# Patient Record
Sex: MALE | Race: OTHER | Employment: FULL TIME | URBAN - METROPOLITAN AREA
[De-identification: names, ages, dates, MRNs, and addresses within clinical notes are randomized per-mention and may not be internally consistent; named-entity substitution may affect disease eponyms.]

---

## 2018-10-11 ENCOUNTER — HOSPITAL ENCOUNTER (OUTPATIENT)
Dept: LAB | Facility: MEDICAL CENTER | Age: 58
End: 2018-10-11
Attending: FAMILY MEDICINE
Payer: COMMERCIAL

## 2018-10-11 LAB
ALBUMIN SERPL BCP-MCNC: 4.6 G/DL (ref 3.2–4.9)
ALBUMIN/GLOB SERPL: 2 G/DL
ALP SERPL-CCNC: 60 U/L (ref 30–99)
ALT SERPL-CCNC: 19 U/L (ref 2–50)
ANION GAP SERPL CALC-SCNC: 6 MMOL/L (ref 0–11.9)
AST SERPL-CCNC: 17 U/L (ref 12–45)
BILIRUB SERPL-MCNC: 0.8 MG/DL (ref 0.1–1.5)
BUN SERPL-MCNC: 16 MG/DL (ref 8–22)
CALCIUM SERPL-MCNC: 9.6 MG/DL (ref 8.5–10.5)
CHLORIDE SERPL-SCNC: 107 MMOL/L (ref 96–112)
CHOLEST SERPL-MCNC: 215 MG/DL (ref 100–199)
CO2 SERPL-SCNC: 27 MMOL/L (ref 20–33)
CREAT SERPL-MCNC: 1.03 MG/DL (ref 0.5–1.4)
EST. AVERAGE GLUCOSE BLD GHB EST-MCNC: 128 MG/DL
GLOBULIN SER CALC-MCNC: 2.3 G/DL (ref 1.9–3.5)
GLUCOSE SERPL-MCNC: 103 MG/DL (ref 65–99)
HBA1C MFR BLD: 6.1 % (ref 0–5.6)
HDLC SERPL-MCNC: 32 MG/DL
LDLC SERPL CALC-MCNC: 145 MG/DL
POTASSIUM SERPL-SCNC: 3.6 MMOL/L (ref 3.6–5.5)
PROT SERPL-MCNC: 6.9 G/DL (ref 6–8.2)
SODIUM SERPL-SCNC: 140 MMOL/L (ref 135–145)
TRIGL SERPL-MCNC: 189 MG/DL (ref 0–149)

## 2018-10-11 PROCEDURE — 83036 HEMOGLOBIN GLYCOSYLATED A1C: CPT

## 2018-10-11 PROCEDURE — 80053 COMPREHEN METABOLIC PANEL: CPT

## 2018-10-11 PROCEDURE — 36415 COLL VENOUS BLD VENIPUNCTURE: CPT

## 2018-10-11 PROCEDURE — 80061 LIPID PANEL: CPT

## 2018-11-24 ENCOUNTER — HOSPITAL ENCOUNTER (OUTPATIENT)
Dept: LAB | Facility: MEDICAL CENTER | Age: 58
End: 2018-11-24
Attending: FAMILY MEDICINE
Payer: COMMERCIAL

## 2018-11-24 LAB
ALBUMIN SERPL BCP-MCNC: 4.4 G/DL (ref 3.2–4.9)
ALBUMIN/GLOB SERPL: 1.8 G/DL
ALP SERPL-CCNC: 63 U/L (ref 30–99)
ALT SERPL-CCNC: 25 U/L (ref 2–50)
ANION GAP SERPL CALC-SCNC: 6 MMOL/L (ref 0–11.9)
AST SERPL-CCNC: 20 U/L (ref 12–45)
BILIRUB SERPL-MCNC: 0.8 MG/DL (ref 0.1–1.5)
BUN SERPL-MCNC: 12 MG/DL (ref 8–22)
CALCIUM SERPL-MCNC: 9.5 MG/DL (ref 8.5–10.5)
CHLORIDE SERPL-SCNC: 107 MMOL/L (ref 96–112)
CHOLEST SERPL-MCNC: 120 MG/DL (ref 100–199)
CO2 SERPL-SCNC: 29 MMOL/L (ref 20–33)
CREAT SERPL-MCNC: 1.02 MG/DL (ref 0.5–1.4)
GLOBULIN SER CALC-MCNC: 2.4 G/DL (ref 1.9–3.5)
GLUCOSE SERPL-MCNC: 95 MG/DL (ref 65–99)
HDLC SERPL-MCNC: 35 MG/DL
LDLC SERPL CALC-MCNC: 52 MG/DL
POTASSIUM SERPL-SCNC: 4.1 MMOL/L (ref 3.6–5.5)
PROT SERPL-MCNC: 6.8 G/DL (ref 6–8.2)
SODIUM SERPL-SCNC: 142 MMOL/L (ref 135–145)
TRIGL SERPL-MCNC: 167 MG/DL (ref 0–149)

## 2018-11-24 PROCEDURE — 80061 LIPID PANEL: CPT

## 2018-11-24 PROCEDURE — 80053 COMPREHEN METABOLIC PANEL: CPT

## 2018-11-24 PROCEDURE — 36415 COLL VENOUS BLD VENIPUNCTURE: CPT

## 2019-05-21 ENCOUNTER — HOSPITAL ENCOUNTER (OUTPATIENT)
Dept: LAB | Facility: MEDICAL CENTER | Age: 59
End: 2019-05-21
Attending: FAMILY MEDICINE
Payer: COMMERCIAL

## 2019-05-21 LAB
ALBUMIN SERPL BCP-MCNC: 4.2 G/DL (ref 3.2–4.9)
ALBUMIN/GLOB SERPL: 1.9 G/DL
ALP SERPL-CCNC: 72 U/L (ref 30–99)
ALT SERPL-CCNC: 23 U/L (ref 2–50)
ANION GAP SERPL CALC-SCNC: 8 MMOL/L (ref 0–11.9)
AST SERPL-CCNC: 18 U/L (ref 12–45)
BILIRUB SERPL-MCNC: 0.8 MG/DL (ref 0.1–1.5)
BUN SERPL-MCNC: 12 MG/DL (ref 8–22)
CALCIUM SERPL-MCNC: 8.9 MG/DL (ref 8.5–10.5)
CHLORIDE SERPL-SCNC: 109 MMOL/L (ref 96–112)
CHOLEST SERPL-MCNC: 192 MG/DL (ref 100–199)
CO2 SERPL-SCNC: 25 MMOL/L (ref 20–33)
CREAT SERPL-MCNC: 1.04 MG/DL (ref 0.5–1.4)
EST. AVERAGE GLUCOSE BLD GHB EST-MCNC: 126 MG/DL
FASTING STATUS PATIENT QL REPORTED: NORMAL
GLOBULIN SER CALC-MCNC: 2.2 G/DL (ref 1.9–3.5)
GLUCOSE SERPL-MCNC: 101 MG/DL (ref 65–99)
HBA1C MFR BLD: 6 % (ref 0–5.6)
HDLC SERPL-MCNC: 32 MG/DL
LDLC SERPL CALC-MCNC: 133 MG/DL
POTASSIUM SERPL-SCNC: 3.8 MMOL/L (ref 3.6–5.5)
PROT SERPL-MCNC: 6.4 G/DL (ref 6–8.2)
SODIUM SERPL-SCNC: 142 MMOL/L (ref 135–145)
TRIGL SERPL-MCNC: 137 MG/DL (ref 0–149)

## 2019-05-21 PROCEDURE — 83036 HEMOGLOBIN GLYCOSYLATED A1C: CPT

## 2019-05-21 PROCEDURE — 80053 COMPREHEN METABOLIC PANEL: CPT

## 2019-05-21 PROCEDURE — 36415 COLL VENOUS BLD VENIPUNCTURE: CPT

## 2019-05-21 PROCEDURE — 80061 LIPID PANEL: CPT

## 2021-05-21 ENCOUNTER — OCCUPATIONAL MEDICINE (OUTPATIENT)
Dept: URGENT CARE | Facility: CLINIC | Age: 61
End: 2021-05-21
Payer: COMMERCIAL

## 2021-05-21 VITALS
WEIGHT: 175 LBS | OXYGEN SATURATION: 98 % | TEMPERATURE: 97.1 F | HEART RATE: 61 BPM | SYSTOLIC BLOOD PRESSURE: 118 MMHG | RESPIRATION RATE: 16 BRPM | DIASTOLIC BLOOD PRESSURE: 70 MMHG | BODY MASS INDEX: 27.47 KG/M2 | HEIGHT: 67 IN

## 2021-05-21 DIAGNOSIS — M25.511 ACUTE PAIN OF BOTH SHOULDERS: ICD-10-CM

## 2021-05-21 DIAGNOSIS — S46.919A MUSCLE STRAIN OF SHOULDER REGION, UNSPECIFIED LATERALITY, INITIAL ENCOUNTER: ICD-10-CM

## 2021-05-21 DIAGNOSIS — Z02.83 ENCOUNTER FOR DRUG SCREENING: ICD-10-CM

## 2021-05-21 DIAGNOSIS — M25.512 ACUTE PAIN OF BOTH SHOULDERS: ICD-10-CM

## 2021-05-21 LAB
AMP AMPHETAMINE: NORMAL
BAR BARBITURATES: NORMAL
BREATH ALCOHOL COMMENT: NORMAL
BZO BENZODIAZEPINES: NORMAL
COC COCAINE: NORMAL
INT CON NEG: NORMAL
INT CON POS: NORMAL
MDMA ECSTASY: NORMAL
MET METHAMPHETAMINES: NORMAL
MTD METHADONE: NORMAL
OPI OPIATES: NORMAL
OXY OXYCODONE: NORMAL
PCP PHENCYCLIDINE: NORMAL
POC BREATHALIZER: 0 PERCENT (ref 0–0.01)
POC URINE DRUG SCREEN OCDRS: NEGATIVE
THC: NORMAL

## 2021-05-21 PROCEDURE — 80305 DRUG TEST PRSMV DIR OPT OBS: CPT | Performed by: NURSE PRACTITIONER

## 2021-05-21 PROCEDURE — 82075 ASSAY OF BREATH ETHANOL: CPT | Performed by: NURSE PRACTITIONER

## 2021-05-21 PROCEDURE — 99203 OFFICE O/P NEW LOW 30 MIN: CPT | Performed by: NURSE PRACTITIONER

## 2021-05-21 RX ORDER — LISINOPRIL 20 MG/1
TABLET ORAL
COMMUNITY
Start: 2021-03-31

## 2021-05-21 ASSESSMENT — ENCOUNTER SYMPTOMS
WEAKNESS: 0
SENSORY CHANGE: 0

## 2021-05-21 NOTE — PATIENT INSTRUCTIONS
Shoulder Pain  Many things can cause shoulder pain, including:  · An injury to the shoulder.  · Overuse of the shoulder.  · Arthritis.  The source of the pain can be:  · Inflammation.  · An injury to the shoulder joint.  · An injury to a tendon, ligament, or bone.  Follow these instructions at home:  Pay attention to changes in your symptoms. Let your health care provider know about them. Follow these instructions to relieve your pain.  If you have a sling:  · Wear the sling as told by your health care provider. Remove it only as told by your health care provider.  · Loosen the sling if your fingers tingle, become numb, or turn cold and blue.  · Keep the sling clean.  · If the sling is not waterproof:  ? Do not let it get wet. Remove it to shower or bathe.  · Move your arm as little as possible, but keep your hand moving to prevent swelling.  Managing pain, stiffness, and swelling    · If directed, put ice on the painful area:  ? Put ice in a plastic bag.  ? Place a towel between your skin and the bag.  ? Leave the ice on for 20 minutes, 2-3 times per day. Stop applying ice if it does not help with the pain.  · Squeeze a soft ball or a foam pad as much as possible. This helps to keep the shoulder from swelling. It also helps to strengthen the arm.  General instructions  · Take over-the-counter and prescription medicines only as told by your health care provider.  · Keep all follow-up visits as told by your health care provider. This is important.  Contact a health care provider if:  · Your pain gets worse.  · Your pain is not relieved with medicines.  · New pain develops in your arm, hand, or fingers.  Get help right away if:  · Your arm, hand, or fingers:  ? Tingle.  ? Become numb.  ? Become swollen.  ? Become painful.  ? Turn white or blue.  Summary  · Shoulder pain can be caused by an injury, overuse, or arthritis.  · Pay attention to changes in your symptoms. Let your health care provider know about  them.  · This condition may be treated with a sling, ice, and pain medicines.  · Contact your health care provider if the pain gets worse or new pain develops. Get help right away if your arm, hand, or fingers tingle or become numb, swollen, or painful.  · Keep all follow-up visits as told by your health care provider. This is important.  This information is not intended to replace advice given to you by your health care provider. Make sure you discuss any questions you have with your health care provider.  Document Released: 09/27/2006 Document Revised: 07/02/2019 Document Reviewed: 07/02/2019  Elsevier Patient Education © 2020 Elsevier Inc.

## 2021-05-21 NOTE — LETTER
[facility] [address] [place] [address]  Phone:  [phone] - Fax:  [phone]   [facility] Progress Report and Disability Certification  Date of Service: 5/21/2021   No Show:  No  Date / Time of Next Visit: 5/24/2021 @ 4:30 PM [facility]   Claim Information   Patient Name: Jennifer Ji  Claim Number:     Employer: [employer]  Date of Injury: 5/20/2021     Insurer / TPA: [employer]  ID / SSN:     Occupation: Laundry Eng'r  Diagnosis: Diagnoses of Muscle strain of shoulder region, unspecified laterality, initial encounter, Acute pain of both shoulders, and Encounter for drug screening were pertinent to this visit.    Medical Information   Related to Industrial Injury? Yes    Subjective Complaints:  DOI: 05/20/2021. Patient reports waking up with bilateral shoulder pain this morning. Relates it to activities at work the previous day. He had been laying on his stomach replacing a hose, with his left arm bent on piping, and his right arm extended in front of him when his left arm slipped. Did not feel pain yesterday. Has had improvement today. Initially had increase in pain with raising arms in bilateral AC area. Intermittent aching. No numbness or weakness. No history of shoulder issues of injury. Denies second job.     Objective Findings: Physical Exam  Cardiovascular:      Pulses:           Radial pulses are 2+ on the right side and 2+ on the left side.   Musculoskeletal:         General: Tenderness present. No swelling or deformity.      Right shoulder: Tenderness present. No swelling, deformity, effusion or crepitus. Normal range of motion. Normal strength. Normal pulse.      Left shoulder: No swelling, deformity, effusion, tenderness or crepitus. Normal range of motion. Normal strength. Normal pulse.      Cervical back: Full passive range of motion without pain and normal range of motion. No spinous process tenderness or muscular  tenderness.      Comments: Reports mild tenderness to palpation over bilateral AC and lateral aspects of shoulders. Strength 5/5 bilaterally. Strong , 5/5. No grimacing with ROM, or report of increase pain at this time. Distal neurovascular intact.   Skin:     Capillary Refill: Capillary refill takes less than 2 seconds.        Pre-Existing Condition(s): None Known   Assessment:   Initial Visit    Status: Additional Care Required  Permanent Disability:No    Plan: Medication    Diagnostics:      Comments:       Disability Information   Status: Released to Restricted Duty    From:  5/21/2021  Through: 5/24/2021 Restrictions are: Temporary   Physical Restrictions   Sitting:    Standing:    Stooping:    Bending:      Squatting:    Walking:    Climbing:    Pushing:      Pulling:    Other:    Reaching Above Shoulder (L): 0 hrs/day Reaching Above Shoulder (R): 0 hrs/day     Reaching Below Shoulder (L):    Reaching Below Shoulder (R):      Not to exceed Weight Limits   Carrying(hrs):   Weight Limit(lb): < or = to 10 pounds Lifting(hrs):   Weight  Limit(lb): < or = to 10 pounds   Comments: -NSAID's (ibuprofen) and tylenol as directed for pain and inflammation.   -RICE Therapy: Rest, Ice   -Gentle range of motion exercises.  -Follow up in 3 days. Follow up emergently for severe uncontrolled pain, neurovascular compromise (decreased sensation, motion, or circulation).    Repetitive Actions   Hands: i.e. Fine Manipulations from Grasping:     Feet: i.e. Operating Foot Controls:     Driving / Operate Machinery:     Provider Name:   TASH Vazquez Physician Signature:  Physician Name:     Clinic Name / Location: [address] [place] [address] Clinic Phone Number: Dept: [phone]   Appointment Time: 1:30 Pm Visit Start Time: 1:56 PM   Check-In Time:  1:35 Pm Visit Discharge Time:  2:51 PM   Original-Treating Physician or Chiropractor    Page 2-Insurer/TPA    Page 3-Employer     Page 4-Employee

## 2021-05-21 NOTE — PROGRESS NOTES
"Subjective:      Jennifer Ji is a 61 y.o. male who presents with Other (NEW WC, 05/20/21, Injured shoulders)      DOI: 05/20/2021. Patient reports waking up with bilateral shoulder pain this morning. Relates it to activities at work the previous day. He had been laying on his stomach replacing a hose, with his left arm bent on piping, and his right arm extended in front of him when his left arm slipped. Did not feel pain yesterday. Has had improvement today. Initially had increase in pain with raising arms in bilateral AC area. Intermittent aching. No numbness or weakness. No history of shoulder issues of injury. Denies second job.       DOI: 05/20/2021. Patient reports waking up with bilateral shoulder pain this morning. Relates it to activities at work the previous day. He had been laying on his stomach replacing a hose, with his left arm bent on piping, and his right arm extended in front of him when his left arm slipped. Did not feel pain yesterday. Has had improvement today. Initially had increase in pain with raising arms in bilateral AC area. Intermittent aching. No numbness or weakness. No history of shoulder issues of injury. Denies second job.      Review of Systems   Musculoskeletal: Positive for joint pain.   Neurological: Negative for sensory change and weakness.   All other systems reviewed and are negative.         Objective:     /70 (BP Location: Left arm, Patient Position: Sitting, BP Cuff Size: Adult long)   Pulse 61   Temp 36.2 °C (97.1 °F) (Temporal)   Resp 16   Ht 1.702 m (5' 7\")   Wt 79.4 kg (175 lb)   SpO2 98%   BMI 27.41 kg/m²      Physical Exam  Vitals reviewed.   Constitutional:       General: He is not in acute distress.     Appearance: He is well-developed.   HENT:      Head: Normocephalic and atraumatic.      Right Ear: External ear normal.      Left Ear: External ear normal.      Nose: Nose normal.   Eyes:      Conjunctiva/sclera: Conjunctivae normal.   Cardiovascular:    "   Rate and Rhythm: Normal rate.      Pulses:           Radial pulses are 2+ on the right side and 2+ on the left side.   Pulmonary:      Effort: Pulmonary effort is normal.   Musculoskeletal:         General: Tenderness present. No swelling or deformity. Normal range of motion.      Right shoulder: Tenderness present. No swelling, deformity, effusion or crepitus. Normal range of motion. Normal strength. Normal pulse.      Left shoulder: No swelling, deformity, effusion, tenderness or crepitus. Normal range of motion. Normal strength. Normal pulse.      Cervical back: Full passive range of motion without pain and normal range of motion. No spinous process tenderness or muscular tenderness.      Comments: Reports mild tenderness to palpation over bilateral AC and lateral aspects of shoulders. Strength 5/5 bilaterally. Strong , 5/5. No grimacing with ROM, or report of increase pain at this time. Distal neurovascular intact.   Skin:     General: Skin is warm and dry.      Capillary Refill: Capillary refill takes less than 2 seconds.      Findings: No rash.   Neurological:      General: No focal deficit present.      Mental Status: He is alert and oriented to person, place, and time.      GCS: GCS eye subscore is 4. GCS verbal subscore is 5. GCS motor subscore is 6.   Psychiatric:         Mood and Affect: Mood normal.         Speech: Speech normal.         Behavior: Behavior normal.         Thought Content: Thought content normal.         Judgment: Judgment normal.         Physical Exam  Cardiovascular:      Pulses:           Radial pulses are 2+ on the right side and 2+ on the left side.   Musculoskeletal:         General: Tenderness present. No swelling or deformity.      Right shoulder: Tenderness present. No swelling, deformity, effusion or crepitus. Normal range of motion. Normal strength. Normal pulse.      Left shoulder: No swelling, deformity, effusion, tenderness or crepitus. Normal range of motion. Normal  strength. Normal pulse.      Cervical back: Full passive range of motion without pain and normal range of motion. No spinous process tenderness or muscular tenderness.      Comments: Reports mild tenderness to palpation over bilateral AC and lateral aspects of shoulders. Strength 5/5 bilaterally. Strong , 5/5. No grimacing with ROM, or report of increase pain at this time. Distal neurovascular intact.   Skin:     Capillary Refill: Capillary refill takes less than 2 seconds.                   Assessment/Plan:        1. Muscle strain of shoulder region, unspecified laterality, initial encounter    2. Acute pain of both shoulders  -NSAID's (ibuprofen) and tylenol as directed for pain and inflammation.   -RICE Therapy: Rest, Ice   -Gentle range of motion exercises.  -Follow up in 3 days. Follow up emergently for severe uncontrolled pain, neurovascular compromise (decreased sensation, motion, or circulation).     Discussed initial conservative measures.

## 2021-05-21 NOTE — LETTER
"EMPLOYEE’S CLAIM FOR COMPENSATION/ REPORT OF INITIAL TREATMENT  FORM C-4    EMPLOYEE’S CLAIM - PROVIDE ALL INFORMATION REQUESTED   First Name  Mar Last Name  Cabales Birthdate                    1960                Sex  male Claim Number   Home Address  [address] [facility] [place] Age  61 y.o. 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 (if applicable)  Replace 2 1/2 inch high pressure hose    How did this injury or occupational disease occur? (Be specific an answer in detail. Use additional sheet if necessary)  I slip my left elbow under the machine while I tried to disconnect the broken 2 1/2 inches high pressure hose   If you believe that you have an occupational disease, when did you first have knowledge of the disability and it relationship to your employment?  5/21/21 Witnesses to the Accident  My supervisor      Nature of Injury or Occupational Disease  Strain  Part(s) of Body Injured or Affected  Shoulder (L), Shoulder (R),     I certify that the above is true and correct to the best of my " knowledge and that I have provided this information in order to obtain the benefits of [place]  I hereby authorize any physician, chiropractor, surgeon, practitioner, or other person, any hospital, including [facility], any medical service organization, any insurance company, or other institution or organization to release to each other, any medical or other information, including benefits paid or payable, pertinent to this injury or disease, except information relative to diagnosis, treatment and/or counseling for AIDS, psychological conditions, alcohol or controlled substances, for which I must give specific authorization.  A Photostat of this authorization shall be as valid as the original.     Date   Place   Employee’s Signature   THIS REPORT MUST BE COMPLETED AND MAILED WITHIN 3 WORKING DAYS OF TREATMENT   Place  [facility]  Name of Facility  [facility]   Date  5/21/2021 Diagnosis  (S46.919A) Muscle strain of shoulder region, unspecified laterality, initial encounter  (M25.511,  M25.512) Acute pain of both shoulders  (Z02.83) Encounter for drug screening Is there evidence the injured employee was under the              influence of alcohol and/or another controlled substance at the time of accident?   Hour  1:56 PM Description of Injury or Disease  Diagnoses of Muscle strain of shoulder region, unspecified laterality, initial encounter, Acute pain of both shoulders, and Encounter for drug screening were pertinent to this visit. No   Treatment  -NSAID's (ibuprofen) and tylenol as directed for pain and inflammation.   -RICE Therapy: Rest, Ice   -Gentle range of motion exercises.  Have you advised the patient to remain off work five days or     more? No   X-Ray Findings      If Yes   From Date  To Date      From information given by the employee, together with  "medical evidence, can you directly connect this injury or occupational disease as job incurred?  Yes If No Full Duty    No Modified Duty  Yes   Is additional medical care by a physician indicated?  Yes If Modified Duty, Specify any Limitations / Restrictions  Note D-39   Do you know of any previous injury or disease contributing to this condition or occupational disease?                            No   Date  5/21/2021 Print Doctor’s Name   TASH Vazquez certify the employer’s copy of  this form was mailed on:   Address  [address]     [facility] Zip  [address]    Provider’s Tax ID Number  [id number] Telephone  Dept: [phone]         Signature:     Degree          ORIGINAL-TREATING PHYSICIAN OR CHIROPRACTOR    PAGE 2-INSURER/TPA    PAGE 3-EMPLOYER    PAGE 4-EMPLOYEE        Form C-4 (rev.10/07)           BRIEF DESCRIPTION OF RIGHTS AND BENEFITS  (Pursuant to [place] 616C.050)    Notice of Injury or Occupational Disease (Incident Report Form C-1): If an injury or occupational disease (OD) arises out of and in the course of employment, you must provide written notice to your employer as soon as practicable, but no later than 7 days after the accident or OD. Your employer shall maintain a sufficient supply of the required forms.    Claim for Compensation (Form C-4): If medical treatment is sought, the form C-4 is available at the place of initial treatment. A completed \"Claim for Compensation\" (Form C-4) must be filed within 90 days after an accident or OD. The treating physician or chiropractor must, within 3 working days after treatment, complete and mail to the employer, the employer's insurer and third-party , the Claim for Compensation.    Medical Treatment: If you require medical treatment for your on-the-job injury or OD, you may be required to select a physician or chiropractor from a list provided by your workers’ compensation insurer, if it has " contracted with an Organization for Managed Care (MCO) or Preferred Provider Organization (PPO) or providers of health care. If your employer has not entered into a contract with an MCO or PPO, you may select a physician or chiropractor from the Panel of Physicians and Chiropractors. Any medical costs related to your industrial injury or OD will be paid by your insurer.    Temporary Total Disability (TTD): If your doctor has certified that you are unable to work for a period of at least 5 consecutive days, or 5 cumulative days in a 20-day period, or places restrictions on you that your employer does not accommodate, you may be entitled to TTD compensation.    Temporary Partial Disability (TPD): If the wage you receive upon reemployment is less than the compensation for TTD to which you are entitled, the insurer may be required to pay you TPD compensation to make up the difference. TPD can only be paid for a maximum of 24 months.    Permanent Partial Disability (PPD): When your medical condition is stable and there is an indication of a PPD as a result of your injury or OD, within 30 days, your insurer must arrange for an evaluation by a rating physician or chiropractor to determine the degree of your PPD. The amount of your PPD award depends on the date of injury, the results of the PPD evaluation, your age and wage.    Permanent Total Disability (PTD): If you are medically certified by a treating physician or chiropractor as permanently and totally disabled and have been granted a PTD status by your insurer, you are entitled to receive monthly benefits not to exceed 66 2/3% of your average monthly wage. The amount of your PTD payments is subject to reduction if you previously received a lump-sum PPD award.    Vocational Rehabilitation Services: You may be eligible for vocational rehabilitation services if you are unable to return to the job due to a permanent physical impairment or permanent restrictions as a  result of your injury or occupational disease.    Transportation and Per Paulo Reimbursement: You may be eligible for travel expenses and per paulo associated with medical treatment.    Reopening: You may be able to reopen your claim if your condition worsens after claim closure.     Appeal Process: If you disagree with a written determination issued by the insurer or the insurer does not respond to your request, you may appeal to the Department of Administration, , by following the instructions contained in your determination letter. You must appeal the determination within 70 days from the date of the determination letter at [address], [address], [place], [place] [address], or [address], [address], [place], [place] [address]. If you disagree with the  decision, you may appeal to the Department of Administration, . You must file your appeal within 30 days from the date of the  decision letter at [address], [address], [place], [place] [address], or [address] [facility] [address], [place], [place] [address]. If you disagree with a decision of an , you may file a petition for judicial review with the District Court. You must do so within 30 days of the Appeal Officer’s decision. You may be represented by an  at your own expense or you may contact the [facility] for possible representation.    [place]  for Injured Workers (NAIW): If you disagree with a  decision, you may request that NAIW represent you without charge at an  Hearing. For information regarding denial of benefits, you may contact the [facility] at: [address], [address], [place] [address], [phone], or [address], [address], [place] [address], [phone]    To File a Complaint with the Division: If you wish to file a complaint with the  of the Division of Industrial  Relations (DIR),  please contact the Workers’ Compensation Section, [address], [address], [place], [place] [address], telephone (315) [phone], or [address], [address], [place], [place] [address], telephone [phone]) [phone].    For assistance with Workers’ Compensation Issues: You may contact the [facility] Office for Consumer Health Assistance, [address], [address], [place], [place] [address], Toll Free 1-395.729.6398, Web site: http://[facility].[place].gov/Programs/LYN E-mail: lyn@[facility].nv.[facility]              __________________________________________________________________                                    _________________            Employee Name / Signature                                                                                                                            Date                                                                                                                                                                                                                              D-2 (rev. 10/20)

## 2021-05-24 ENCOUNTER — OCCUPATIONAL MEDICINE (OUTPATIENT)
Dept: OCCUPATIONAL MEDICINE | Facility: CLINIC | Age: 61
End: 2021-05-24
Payer: COMMERCIAL

## 2021-05-24 VITALS
DIASTOLIC BLOOD PRESSURE: 84 MMHG | HEIGHT: 67 IN | SYSTOLIC BLOOD PRESSURE: 138 MMHG | WEIGHT: 173 LBS | RESPIRATION RATE: 16 BRPM | BODY MASS INDEX: 27.15 KG/M2 | HEART RATE: 71 BPM | TEMPERATURE: 98.3 F | OXYGEN SATURATION: 96 %

## 2021-05-24 DIAGNOSIS — S46.919D: ICD-10-CM

## 2021-05-24 PROCEDURE — 99213 OFFICE O/P EST LOW 20 MIN: CPT | Performed by: NURSE PRACTITIONER

## 2021-05-24 NOTE — PROGRESS NOTES
"Subjective:     Jennifer Ji is a 61 y.o. male who presents for Follow-Up (WC New To You DOI: 5/20/21 Shoulders; Better Rm 16)      DOI: 05/20/2021. Patient reports waking up with bilateral shoulder pain this morning. Relates it to activities at work the previous day. He had been laying on his stomach replacing a hose, with his left arm bent on piping, and his right arm extended in front of him when his left arm slipped. Today mild improvement. Patient states that he has continued soreness in both his shoulder joints, not as severe as the initial injury. He denies numbness, weakness, or limited range of motion. He is not taking any OTC medications for this injury. He is using hot showers and stretching exercises with moderate relief. He is tolerating light duty as tolerated. Plan of care discussed with patient.     ROS: All systems were reviewed on intake form, form was reviewed and signed. See scanned documents in media. Pertinent positives and negatives included in HPI.    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 Neg TTP swelling, effusion, or crepitus. Normal range of motion. Normal strength. Normal pulse.   Left shoulder: No swelling or gross deformity noted.  Negative effusion, tenderness, or crepitus. Normal range of motion. Normal strength. Normal pulse.   Bilateral shoulders: Neg TTP over bilateral AC and lateral aspects of shoulders, but this is the area of symptoms per patient when pain occurs. Strength 5/5 bilaterally.  Strength 5/5. FROM, with subjective increase pain.  Distal neurovascular " intact.     Assessment/Plan:       1. Muscle strain of shoulder region, unspecified laterality, subsequent encounter    Released to Restricted Duty FROM 5/24/2021 TO 6/1/2021     Follow-up in 1 week  Restricted duty  Recommend OTC Tylenol/ibuprofen or OTC topical ointments i.e. Tiger balm, Voltaren, or Biofreeze if needed  Recommend ice/heat application and gentle range of motion and stretching exercises demonstrated    Differential diagnosis, natural history, supportive care, and indications for immediate follow-up discussed.    Approximately 25 minutes were spent in reviewing notes, preparing for visit, obtaining history, exam and evaluation, patient counseling/education and post visit documentation/orders.

## 2021-05-24 NOTE — LETTER
[address],   [address] [place] [address]  Phone:  [phone] - Fax:  [phone]   Occupational Health [facility] Progress Report and Disability Certification  Date of Service: 5/24/2021   No Show:  No  Date / Time of Next Visit: 6/1/2021 @ 9:45 AM   Claim Information   Patient Name: Jennifer Ji  Claim Number:     Employer: [employer]  Date of Injury: 5/20/2021     Insurer / TPA: [employer]  ID / SSN:     Occupation: Laundry Eng'r  Diagnosis: The encounter diagnosis was Muscle strain of shoulder region, unspecified laterality, subsequent encounter.    Medical Information   Related to Industrial Injury? Yes    Subjective Complaints:  DOI: 05/20/2021. Patient reports waking up with bilateral shoulder pain this morning. Relates it to activities at work the previous day. He had been laying on his stomach replacing a hose, with his left arm bent on piping, and his right arm extended in front of him when his left arm slipped. Today mild improvement. Patient states that he has continued soreness in both his shoulder joints, not as severe as the initial injury. He denies numbness, weakness, or limited range of motion. He is not taking any OTC medications for this injury. He is using hot showers and stretching exercises with moderate relief. He is tolerating light duty as tolerated. Plan of care discussed with patient.    Objective Findings: Right shoulder: Neg discolorations or gross deformity noted. Neg TTP swelling, effusion, or crepitus. Normal range of motion. Normal strength. Normal pulse.   Left shoulder: No swelling or gross deformity noted.  Negative effusion, tenderness, or crepitus. Normal range of motion. Normal strength. Normal pulse.   Bilateral shoulders: Neg TTP over bilateral AC and lateral aspects of shoulders, but this is the area of symptoms per patient when pain occurs. Strength 5/5 bilaterally.  Strength 5/5. FROM, with  subjective increase pain.  Distal neurovascular intact.    Pre-Existing Condition(s):     Assessment:   Condition Improved    Status: Additional Care Required  Permanent Disability:No    Plan:      Diagnostics:      Comments:  Follow-up in 1 week  Restricted duty  Recommend OTC Tylenol/ibuprofen or OTC topical ointments i.e. Tiger balm, Voltaren, or Biofreeze if needed  Recommend ice/heat application and gentle range of motion and stretching exercises demonstrated    Disability Information   Status: Released to Restricted Duty    From:  5/24/2021  Through: 6/1/2021 Restrictions are: Temporary   Physical Restrictions   Sitting:    Standing:    Stooping:    Bending:      Squatting:    Walking:    Climbing:    Pushing:      Pulling:    Other:    Reaching Above Shoulder (L): < or = to 1 hr/day Reaching Above Shoulder (R): < or = 1 hrs/day     Reaching Below Shoulder (L):    Reaching Below Shoulder (R):      Not to exceed Weight Limits   Carrying(hrs):   Weight Limit(lb): < or = to 10 pounds  Comments:Bilateral lower upper extremities only Lifting(hrs):   Weight  Limit(lb): < or = to 10 pounds  Comments:Bilateral lower upper extremities only   Comments:      Repetitive Actions   Hands: i.e. Fine Manipulations from Grasping:     Feet: i.e. Operating Foot Controls:     Driving / Operate Machinery:     Provider Name:   TASH Mora Physician Signature:  Physician Name:     Clinic Name / Location: [address],   [address]  [place] [address] Clinic Phone Number: Dept: [phone]   Appointment Time: 4:30 Pm Visit Start Time: 3:58 PM   Check-In Time:  3:54 Pm Visit Discharge Time: 4:20 PM    Original-Treating Physician or Chiropractor    Page 2-Insurer/TPA    Page 3-Employer    Page 4-Employee

## 2021-06-01 ENCOUNTER — OCCUPATIONAL MEDICINE (OUTPATIENT)
Dept: OCCUPATIONAL MEDICINE | Facility: CLINIC | Age: 61
End: 2021-06-01
Payer: COMMERCIAL

## 2021-06-01 VITALS
OXYGEN SATURATION: 98 % | WEIGHT: 171 LBS | TEMPERATURE: 97.4 F | SYSTOLIC BLOOD PRESSURE: 140 MMHG | HEIGHT: 67 IN | DIASTOLIC BLOOD PRESSURE: 62 MMHG | HEART RATE: 69 BPM | BODY MASS INDEX: 26.84 KG/M2

## 2021-06-01 DIAGNOSIS — S46.919D: ICD-10-CM

## 2021-06-01 PROCEDURE — 99212 OFFICE O/P EST SF 10 MIN: CPT | Performed by: NURSE PRACTITIONER

## 2021-06-01 ASSESSMENT — ENCOUNTER SYMPTOMS
RESPIRATORY NEGATIVE: 1
NEUROLOGICAL NEGATIVE: 1
CARDIOVASCULAR NEGATIVE: 1
PSYCHIATRIC NEGATIVE: 1
MYALGIAS: 1
CONSTITUTIONAL NEGATIVE: 1

## 2021-06-01 NOTE — LETTER
[address],   [address] [place] [address]  Phone:  [phone] - Fax:  [phone]   Occupational Health [facility] Progress Report and Disability Certification  Date of Service: 6/1/2021   No Show:  No  Date / Time of Next Visit:   Discharged/MMI  Released to full duty    Claim Information   Patient Name: Jennifer Ji  Claim Number:     Employer: [employer]  Date of Injury: 5/20/2021     Insurer / TPA: [employer]  ID / SSN:     Occupation: Laundry Eng'r  Diagnosis: The encounter diagnosis was Muscle strain of shoulder region, unspecified laterality, subsequent encounter.    Medical Information   Related to Industrial Injury? Yes    Subjective Complaints:   DOI: 05/20/2021. Patient reports waking up with bilateral shoulder pain this morning. Relates it to activities at work the previous day. He had been laying on his stomach replacing a hose, with his left arm bent on piping, and his right arm extended in front of him when his left arm slipped. Today continued overall improvement. Patient states that he has continued soreness in both his shoulder joints, which is relieved with exercises. He denies numbness, weakness, or limited range of motion. He is not taking any OTC medications for this injury. He is using hot showers and stretching exercises with moderate relief. He is tolerating light duty as tolerated. He will be released from care at this time. Anticipate complete resolution of symptoms over time. Plan of care discussed with patient.    Objective Findings: Right shoulder: Neg discolorations or gross deformity noted. Neg TTP swelling, effusion, or crepitus. Normal range of motion. Normal strength. Normal pulse.   Left shoulder: No swelling or gross deformity noted.  Negative effusion, tenderness, or crepitus. Normal range of motion. Normal strength. Normal pulse.   Bilateral shoulders: Neg TTP over bilateral AC and lateral aspects of  shoulders, but this is the area of symptoms per patient when pain occurs. Strength 5/5 bilaterally.  Strength 5/5. FROM, with subjective increase pain.  Distal neurovascular intact.    Pre-Existing Condition(s):     Assessment:   Condition Improved    Status: Discharged /  MMI  Permanent Disability:No    Plan:      Diagnostics:      Comments:  Discharged/MMI  Released to full duty   Follow-up if needed     Disability Information   Status: Released to Full Duty    From:  6/1/2021  Through:   Restrictions are:     Physical Restrictions   Sitting:    Standing:    Stooping:    Bending:      Squatting:    Walking:    Climbing:    Pushing:      Pulling:    Other:    Reaching Above Shoulder (L):   Reaching Above Shoulder (R):       Reaching Below Shoulder (L):    Reaching Below Shoulder (R):      Not to exceed Weight Limits   Carrying(hrs):   Weight Limit(lb):   Lifting(hrs):   Weight  Limit(lb):     Comments:      Repetitive Actions   Hands: i.e. Fine Manipulations from Grasping:     Feet: i.e. Operating Foot Controls:     Driving / Operate Machinery:     Provider Name:   TASH Mora Physician Signature:  Physician Name:     Clinic Name / Location: [address],   [address] [place] [address] Clinic Phone Number: Dept: [phone]   Appointment Time: 9:45 Am Visit Start Time: 9:37 AM   Check-In Time:  9:21 Am Visit Discharge Time:  9:52am   Original-Treating Physician or Chiropractor    Page 2-Insurer/TPA    Page 3-Employer    Page 4-Employee

## 2021-06-01 NOTE — PROGRESS NOTES
"Subjective:     Jennifer Ji is a 61 y.o. male who presents for Other ( DOI: 5/20/21 Shoulders; Better Rm 16))       DOI: 05/20/2021. Patient reports waking up with bilateral shoulder pain this morning. Relates it to activities at work the previous day. He had been laying on his stomach replacing a hose, with his left arm bent on piping, and his right arm extended in front of him when his left arm slipped. Today continued overall improvement. Patient states that he has continued soreness in both his shoulder joints, which is relieved with exercises. He denies numbness, weakness, or limited range of motion. He is not taking any OTC medications for this injury. He is using hot showers and stretching exercises with moderate relief. He is tolerating light duty as tolerated. He will be released from care at this time. Anticipate complete resolution of symptoms over time. Plan of care discussed with patient.     Review of Systems   Constitutional: Negative.    Respiratory: Negative.    Cardiovascular: Negative.    Musculoskeletal: Positive for myalgias. Negative for joint pain.   Skin: Negative.    Neurological: Negative.    Psychiatric/Behavioral: Negative.        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 Appears well-developed and well-nourished.   Cardiovascular: Normal rate.    Pulmonary/Chest: Effort normal. No respiratory distress.   Neurological: Patient is alert and oriented to person, place, and time.   Skin: Skin is warm and dry.   Psychiatric: Normal mood and affect. Behavior is normal.     Right shoulder: Neg discolorations or gross deformity noted. Neg TTP swelling, effusion, or crepitus. Normal range of motion. Normal strength. Normal pulse.   Left shoulder: No " swelling or gross deformity noted.  Negative effusion, tenderness, or crepitus. Normal range of motion. Normal strength. Normal pulse.   Bilateral shoulders: Neg TTP over bilateral AC and lateral aspects of shoulders, but this is the area of symptoms per patient when pain occurs. Strength 5/5 bilaterally.  Strength 5/5. FROM, with subjective increase pain.  Distal neurovascular intact.     Assessment/Plan:       1. Muscle strain of shoulder region, unspecified laterality, subsequent encounter    Released to Full Duty FROM 6/1/2021 TO       Discharged/MMI  Released to full duty   Follow-up if needed     Differential diagnosis, natural history, supportive care, and indications for immediate follow-up discussed.    Approximately 15 minutes was spent in preparing for visit, obtaining history, exam and evaluation, patient counseling/education and post visit documentation/orders.